# Patient Record
Sex: MALE | Race: WHITE
[De-identification: names, ages, dates, MRNs, and addresses within clinical notes are randomized per-mention and may not be internally consistent; named-entity substitution may affect disease eponyms.]

---

## 2019-06-22 ENCOUNTER — HOSPITAL ENCOUNTER (EMERGENCY)
Dept: HOSPITAL 43 - DL.ED | Age: 74
LOS: 1 days | Discharge: NURSING, REHAB OR LONG-TERM CARE | End: 2019-06-23
Payer: MEDICARE

## 2019-06-22 DIAGNOSIS — K52.9: Primary | ICD-10-CM

## 2019-06-22 DIAGNOSIS — K80.00: ICD-10-CM

## 2019-06-22 DIAGNOSIS — Z79.82: ICD-10-CM

## 2019-06-22 DIAGNOSIS — Z79.899: ICD-10-CM

## 2019-06-22 DIAGNOSIS — N28.1: ICD-10-CM

## 2019-06-22 LAB
ANION GAP SERPL CALC-SCNC: 13.1 MMOL/L
CHLORIDE SERPL-SCNC: 107 MMOL/L (ref 101–111)
SODIUM SERPL-SCNC: 138 MMOL/L (ref 135–145)

## 2019-06-22 PROCEDURE — 85025 COMPLETE CBC W/AUTO DIFF WBC: CPT

## 2019-06-22 PROCEDURE — 96375 TX/PRO/DX INJ NEW DRUG ADDON: CPT

## 2019-06-22 PROCEDURE — 80053 COMPREHEN METABOLIC PANEL: CPT

## 2019-06-22 PROCEDURE — 74176 CT ABD & PELVIS W/O CONTRAST: CPT

## 2019-06-22 PROCEDURE — 96365 THER/PROPH/DIAG IV INF INIT: CPT

## 2019-06-22 PROCEDURE — 96366 THER/PROPH/DIAG IV INF ADDON: CPT

## 2019-06-22 PROCEDURE — 99285 EMERGENCY DEPT VISIT HI MDM: CPT

## 2019-06-22 PROCEDURE — 82150 ASSAY OF AMYLASE: CPT

## 2019-06-22 PROCEDURE — 87040 BLOOD CULTURE FOR BACTERIA: CPT

## 2019-06-22 PROCEDURE — 83690 ASSAY OF LIPASE: CPT

## 2019-06-22 PROCEDURE — 36415 COLL VENOUS BLD VENIPUNCTURE: CPT

## 2019-06-22 PROCEDURE — 83605 ASSAY OF LACTIC ACID: CPT

## 2019-06-22 PROCEDURE — 96368 THER/DIAG CONCURRENT INF: CPT

## 2019-06-22 NOTE — EDM.PDOC
ED HPI GENERAL MEDICAL PROBLEM





- General


Chief Complaint: Abdominal Pain


Stated Complaint: ADM PAIN


Time Seen by Provider: 06/22/19 22:50


Source of Information: Reports: Patient


History Limitations: Reports: No Limitations





- History of Present Illness


INITIAL COMMENTS - FREE TEXT/NARRATIVE: 





low abd pain since Tues with on-off diarrhoea, did eat lunch today then abd 

pain worsen. has BM every 2 days or so. gives h/o bowel obstruction,


  ** Lower Abdominal


Pain Score (Numeric/FACES): 6





- Related Data


 Allergies











Allergy/AdvReac Type Severity Reaction Status Date / Time


 


No Known Allergies Allergy   Verified 06/22/19 22:37











Home Meds: 


 Home Meds





Allopurinol [Zyloprim] 300 mg PO DAILY 06/22/19 [History]


Aspirin 81 mg PO DAILY 06/22/19 [History]


ClonazePAM [KlonoPIN] 1 mg PO DAILY 06/22/19 [History]


Levothyroxine [Synthroid] 50 mcg PO DAILY 06/22/19 [History]


Metoprolol Succinate 25 mg PO DAILY 06/22/19 [History]


Simvastatin 20 mg PO DAILY 06/22/19 [History]











ED ROS GENERAL





- Review of Systems


Review Of Systems: ROS reveals no pertinent complaints other than HPI.





ED EXAM, GI/ABD





- Physical Exam


Exam: See Below


Exam Limited By: No Limitations


General Appearance: Alert, WD/WN, Mild Distress, Other.  No: Active Emesis (

general discomfort)


Ears: Hearing Grossly Normal


Throat/Mouth: Normal Voice, No Airway Compromise


Head: Atraumatic


Neck: Non-Tender, Full Range of Motion


Respiratory/Chest: No Respiratory Distress


Cardiovascular: Regular Rate, Rhythm


GI/Abdominal Exam: Tender, Other (periumb>).  No: Distended, Guarding, Rigid, 

Rebound


Neurological: Alert, Oriented, Normal Cognition, Normal Gait, No Motor/Sensory 

Deficits


Psychiatric: Flat Affect


Skin Exam: Warm, Dry, Normal Color





Course





- Vital Signs


Last Recorded V/S: 


 Last Vital Signs











Temp  37.4 C   06/22/19 22:44


 


Pulse  62   06/22/19 22:44


 


Resp  18   06/22/19 22:44


 


BP  78/36 L  06/22/19 22:44


 


Pulse Ox  96   06/22/19 22:44














- Orders/Labs/Meds


Orders: 


 Active Orders 24 hr











 Category Date Time Status


 


 CULTURE BLOOD [BC] Stat Lab  06/22/19 22:59 Received


 


 Piperacillin/Tazobactam [Zosyn] 3.375 gm Med  06/23/19 01:43 Active





 Sodium Chloride 0.9% [Normal Saline] 100 ml   





 IV ONETIME   


 


 Sodium Chloride 0.9% [Normal Saline] 1,000 ml Med  06/22/19 23:45 Active





 IV ASDIRECTED   








 Medication Orders





Sodium Chloride (Normal Saline)  1,000 mls @ 500 mls/hr IV ASDIRECTED BRIAN


   Last Admin: 06/22/19 23:48  Dose: 500 mls/hr


Piperacillin Sod/Tazobactam (Sod 3.375 gm/ Sodium Chloride)  100 mls @ 200 mls/

hr IV ONETIME ONE


   Stop: 06/23/19 02:12


   Last Admin: 06/23/19 01:51  Dose: 200 mls/hr








Labs: 


 Laboratory Tests











  06/22/19 06/22/19 06/22/19 Range/Units





  22:59 22:59 22:59 


 


WBC  12.5 H    (5.0-10.0)  10^3/uL


 


RBC  3.78 L    (4.6-6.2)  10^6/uL


 


Hgb  11.8 L    (14.0-18.0)  g/dL


 


Hct  37.7 L    (40.0-54.0)  %


 


MCV  99.7    ()  fL


 


MCH  31.2    (27.0-34.0)  pg


 


MCHC  31.3 L    (33.0-35.0)  g/dL


 


Plt Count  248    (150-450)  10^3/uL


 


Neut % (Auto)  76.5 H    (42.2-75.2)  %


 


Lymph % (Auto)  6.4 L    (20.5-50.1)  %


 


Mono % (Auto)  6.3    (2-8)  %


 


Eos % (Auto)  10.7 H    (1.0-3.0)  %


 


Baso % (Auto)  0.1    (0.0-1.0)  %


 


Sodium   138   (135-145)  mmol/L


 


Potassium   4.1   (3.6-5.0)  mmol/L


 


Chloride   107   (101-111)  mmol/L


 


Carbon Dioxide   22.0   (21.0-31.0)  mmol/L


 


Anion Gap   13.1   


 


BUN   31 H   (7-18)  mg/dL


 


Creatinine   1.4 H   (0.6-1.3)  mg/dL


 


Est Cr Clr Drug Dosing   37.12   mL/min


 


Estimated GFR (MDRD)   50   


 


BUN/Creatinine Ratio   22.14   


 


Glucose   128 H   ()  mg/dL


 


Lactic Acid    1.9  (0.5-2.2)  mmol/L


 


Calcium   8.5   (8.4-10.2)  mg/dl


 


Total Bilirubin   0.6   (0.2-1.0)  mg/dL


 


AST   17   (10-42)  IU/L


 


ALT   11   (10-60)  IU/L


 


Alkaline Phosphatase   60   ()  IU/L


 


Total Protein   5.5 L   (6.7-8.2)  g/dl


 


Albumin   3.1 L   (3.2-5.5)  g/dl


 


Globulin   2.4   


 


Albumin/Globulin Ratio   1.29   


 


Amylase   138 H   ()  U/L


 


Lipase   61 H   (22-51)  U/L











Meds: 


Medications











Generic Name Dose Route Start Last Admin





  Trade Name Freq  PRN Reason Stop Dose Admin


 


Sodium Chloride  1,000 mls @ 500 mls/hr  06/22/19 23:45  06/22/19 23:48





  Normal Saline  IV   500 mls/hr





  ASDIRECTED BRIAN   Administration





     





     





     





     


 


Piperacillin Sod/Tazobactam  100 mls @ 200 mls/hr  06/23/19 01:43  06/23/19 01:

51





  Sod 3.375 gm/ Sodium Chloride  IV  06/23/19 02:12  200 mls/hr





  ONETIME ONE   Administration





     





     





     





     














Discontinued Medications














Generic Name Dose Route Start Last Admin





  Trade Name Freq  PRN Reason Stop Dose Admin


 


Clonazepam  0.5 mg  06/23/19 01:57  





  Klonopin  PO  06/23/19 01:58  





  ONETIME ONE   





     





     





     





     


 


Lorazepam  1 mg  06/22/19 23:38  06/22/19 23:49





  Ativan  IVPUSH  06/22/19 23:39  1 mg





  ONETIME ONE   Administration





     





     





     





     














- Re-Assessments/Exams


Free Text/Narrative Re-Assessment/Exam: 





06/23/19 02:07


case discussed with Dr Krishnamurthy @  who kindly accepted pt.





Departure





- Departure


Time of Disposition: 02:07


Disposition: DC/[facility] to [facility]


Condition: Fair


Clinical Impression: 


 Gastroenteritis, Renal cyst





Cholelithiasis


Qualifiers:


 Cholelithiasis location: gallbladder Cholecystitis presence: with 

cholecystitis Cholecystitis acuity: acute Biliary obstruction: without biliary 

obstruction Qualified Code(s): K80.00 - Calculus of gallbladder with acute 

cholecystitis without obstruction





Hypotension


Qualifiers:


 Hypotension type: other hypotension type Qualified Code(s): I95.89 - Other 

hypotension








- Discharge Information


Forms:  Interfacility Transfer [facility]





- My Orders


Last 24 Hours: 


My Active Orders





06/22/19 22:59


CULTURE BLOOD [BC] Stat 





06/22/19 23:45


Sodium Chloride 0.9% [Normal Saline] 1,000 ml IV ASDIRECTED 





06/23/19 01:43


Piperacillin/Tazobactam [Zosyn] 3.375 gm   Sodium Chloride 0.9% [Normal Saline] 

100 ml IV ONETIME 














- Assessment/Plan


Last 24 Hours: 


My Active Orders





06/22/19 22:59


CULTURE BLOOD [BC] Stat 





06/22/19 23:45


Sodium Chloride 0.9% [Normal Saline] 1,000 ml IV ASDIRECTED 





06/23/19 01:43


Piperacillin/Tazobactam [Zosyn] 3.375 gm   Sodium Chloride 0.9% [Normal Saline] 

100 ml IV ONETIME